# Patient Record
Sex: MALE | Race: WHITE
[De-identification: names, ages, dates, MRNs, and addresses within clinical notes are randomized per-mention and may not be internally consistent; named-entity substitution may affect disease eponyms.]

---

## 2018-01-26 ENCOUNTER — HOSPITAL ENCOUNTER (OUTPATIENT)
Dept: HOSPITAL 47 - RADMRIMAIN | Age: 43
Discharge: HOME | End: 2018-01-26
Payer: COMMERCIAL

## 2018-01-26 DIAGNOSIS — M99.71: ICD-10-CM

## 2018-01-26 DIAGNOSIS — M43.02: ICD-10-CM

## 2018-01-26 DIAGNOSIS — M50.21: ICD-10-CM

## 2018-01-26 DIAGNOSIS — M48.02: Primary | ICD-10-CM

## 2018-01-26 PROCEDURE — 72141 MRI NECK SPINE W/O DYE: CPT

## 2018-01-27 NOTE — MR
MRI CERVICAL SPINE:

 

CLINICAL HISTORY: Neck pain per order. Burning neck pain and numbness for several years on and off wi
th headaches causing pain or weakness into right arm and fingers per patient.

 

TECHNIQUE: Multiplanar, multisequence imaging of the cervical spine is performed without IV contrast.


 

COMPARISON: Outside cervical spine x-ray December 14, 2017.

 

FINDINGS: Sagittal images of the cervical spine show the craniocervical junction to appear within nor
mal limits.  The cervical and upper thoracic spinal cord is normal in course, caliber, and signal. Th
ere is reversal of normal cervical curvature on sagittal images redemonstrated.  There is mild to mod
erate disc space narrowing C5-C6 level with heterogeneous endplate changes posteriorly and mild anter
ior spurring otherwise the vertebral body and intravertebral disk heights are normal. No large poster
ior disc herniations are seen on sagittal images. Modic type II degenerative change right C5-C6 artic
ulation is noted.

 

Axial images show the C2-C3 level to appear within normal limits.

 

Axial images at C3-C4 level shows broad-based disc protrusion with left paracentral/foraminal compone
nt effacing anterior thecal sac and causing fairly severe left-sided neural foraminal narrowing with 
mild right-sided neural foraminal narrowing noted near axial images 38.

 

Axial images at C4-C5 level show focal right paracentral/foraminal disc protrusion effacing anterolat
eral thecal sac and causing moderate to advanced right-sided neural foraminal narrowing. There is mod
erate left-sided neural foraminal narrowing due to spur disc complex axial image 28.

 

Axial images at C5-C6 levels with posterior spur disc complex effacing anterior thecal sac with advan
jonel left greater than the right bilateral neural foraminal narrowing seen near axial image 22.

 

Axial images at C6-C7 level showed broad based posterior disc protrusion effaces the anterior thecal 
sac and causing advanced right greater than left bilateral neural foraminal narrowing.

 

Axial images at C7-T1 level are felt within normal limits.

 

IMPRESSION: Reversal of normal cervical curvature with multilevel degenerative changes seen, most pro
minent findings are noted C5-C6 and C6-C7 level as detailed above.